# Patient Record
Sex: MALE | Race: WHITE | NOT HISPANIC OR LATINO | Employment: OTHER | URBAN - METROPOLITAN AREA
[De-identification: names, ages, dates, MRNs, and addresses within clinical notes are randomized per-mention and may not be internally consistent; named-entity substitution may affect disease eponyms.]

---

## 2018-11-19 ENCOUNTER — OFFICE VISIT CONVERTED (OUTPATIENT)
Dept: SURGERY | Facility: CLINIC | Age: 33
End: 2018-11-19
Attending: NURSE PRACTITIONER

## 2019-01-19 ENCOUNTER — HOSPITAL ENCOUNTER (OUTPATIENT)
Dept: URGENT CARE | Facility: CLINIC | Age: 34
Discharge: HOME | End: 2019-01-19
Attending: EMERGENCY MEDICINE

## 2019-01-21 LAB — BACTERIA SPEC AEROBE CULT: NORMAL

## 2019-09-11 ENCOUNTER — CONVERSION ENCOUNTER (OUTPATIENT)
Dept: UROLOGY | Facility: CLINIC | Age: 34
End: 2019-09-11

## 2019-09-11 ENCOUNTER — OFFICE VISIT CONVERTED (OUTPATIENT)
Dept: UROLOGY | Facility: CLINIC | Age: 34
End: 2019-09-11
Attending: UROLOGY

## 2019-09-11 ENCOUNTER — HOSPITAL ENCOUNTER (OUTPATIENT)
Dept: UROLOGY | Facility: CLINIC | Age: 34
Discharge: HOME | End: 2019-09-11
Attending: UROLOGY

## 2019-09-11 LAB
C TRACH RRNA CVX QL NAA+PROBE: NOT DETECTED
N GONORRHOEA DNA SPEC QL NAA+PROBE: NOT DETECTED

## 2019-09-19 ENCOUNTER — OFFICE VISIT CONVERTED (OUTPATIENT)
Dept: UROLOGY | Facility: CLINIC | Age: 34
End: 2019-09-19
Attending: UROLOGY

## 2019-09-19 ENCOUNTER — CONVERSION ENCOUNTER (OUTPATIENT)
Dept: UROLOGY | Facility: CLINIC | Age: 34
End: 2019-09-19

## 2019-10-21 ENCOUNTER — OFFICE VISIT CONVERTED (OUTPATIENT)
Dept: UROLOGY | Facility: CLINIC | Age: 34
End: 2019-10-21
Attending: UROLOGY

## 2019-10-21 ENCOUNTER — HOSPITAL ENCOUNTER (OUTPATIENT)
Dept: UROLOGY | Facility: CLINIC | Age: 34
Discharge: HOME | End: 2019-10-21
Attending: UROLOGY

## 2019-10-21 LAB — PSA SERPL-MCNC: 3.28 NG/ML (ref 0–4)

## 2020-03-17 ENCOUNTER — OFFICE VISIT CONVERTED (OUTPATIENT)
Dept: OTOLARYNGOLOGY | Facility: CLINIC | Age: 35
End: 2020-03-17
Attending: OTOLARYNGOLOGY

## 2020-05-12 ENCOUNTER — HOSPITAL ENCOUNTER (OUTPATIENT)
Dept: GENERAL RADIOLOGY | Facility: HOSPITAL | Age: 35
Discharge: HOME | End: 2020-05-12

## 2020-10-30 ENCOUNTER — OFFICE VISIT CONVERTED (OUTPATIENT)
Dept: UROLOGY | Facility: CLINIC | Age: 35
End: 2020-10-30
Attending: UROLOGY

## 2020-10-30 ENCOUNTER — HOSPITAL ENCOUNTER (OUTPATIENT)
Dept: UROLOGY | Facility: CLINIC | Age: 35
Discharge: HOME | End: 2020-10-30
Attending: UROLOGY

## 2020-10-31 LAB — PSA SERPL-MCNC: 2.95 NG/ML (ref 0–4)

## 2021-05-13 NOTE — PROGRESS NOTES
Progress Note      Patient Name: Alphonso Santiago   Patient ID: [id number]   Sex: Male   YOB: 1985    Primary Care Provider: Shahnaz ALBRIGHT   Referring Provider: RASHARD BOOGIE    Visit Date: October 30, 2020    Provider: Jaylin Cruz MD   Location: [facility]   Location Address: [address] [place] [address], [address] [place] [address]  [id number]   Location Phone: [phone]          Chief Complaint  · Follow up for elevated PSA      History Of Present Illness  The patient is a 35 year old /White male , who presents for a follow up for elevated PSA.      PSA 10/21/2019  3.28.  Patient had prostatitis with elevation of PSA about a year ago.  His father did not have a prostate cancer and there is no history of prostate cancer in his mom's family.  Is urinating fine and has no dysuria.  He does not drink alcohol.  No recent use of marijuana.  He has no dysuria and his stream is okay.  He has no nocturia.  No real frequency in the daytime.  Patient is sexually active.       Past Medical History  Benign hypertension; Bursitis; Depressive Disorder; Headache; Hyperlipemia; Insomnia; Mild Right Bursitis-scapular; Nosebleed; Prostatitis; Stroke; Thoracic back pain         Past Surgical History  Colonoscopy; EGD (Endoscopy)         Medication List  cyclobenzaprine 10 mg oral tablet; diclofenac sodium 75 mg oral tablet,delayed release (DR/EC); hydrocodone-acetaminophen 5-325 mg oral tablet; hydroxyzine HCl 25 mg oral tablet; lorazepam 1 mg oral tablet; losartan 50 mg oral tablet; Seroquel 50 mg oral tablet         Allergy List  NO KNOWN DRUG ALLERGIES         Family Medical History  Family history of breast cancer; Family history of stroke; Family history of heart disease; Family history of diabetes mellitus         Social History  Alcohol (Former); Caffeine (Current some day); Claustophobic (Unknown); lives with children; Recreational Drug Use (Never); Second hand smoke exposure  "(Never); Single.; Tobacco (Former)         Review of Systems  · Constitutional  o Denies  o : fever, headache, chills  · Eyes  o Denies  o : eye pain, double vision, blurred vision  · HENT  o Denies  o : sinus problems, sore throat, ear infection  · Cardiovascular  o Denies  o : chest pain, high blood pressure, varicosities  · Respiratory  o Denies  o : shortness of breath, wheezing, frequent cough  · Gastrointestinal  o Admits  o : constipation  o Denies  o : nausea, vomiting, heartburn, indigestion, abdominal pain  · Genitourinary  o Denies  o : urgency, frequency, urinary retention, painful urination  · Integument  o Denies  o : rash, itching, boils  · Neurologic  o Denies  o : tingling or numbness, tremors, dizzy spells  · Musculoskeletal  o Admits  o : joint pain, back pain  o Denies  o : neck pain  · Endocrine  o Denies  o : cold intolerance, heat intolerance, tired, excessive thirst, sluggish  · Psychiatric  o Admits  o : feels satisfied with life  o Denies  o : severe depression, concerns with hurting themselves  · Heme-Lymph  o Denies  o : swollen glands, blood clotting problems  · Allergic-Immunologic  o Denies  o : sinus allergy symptoms, hay fever      Vitals  Date Time BP Position Site L\R Cuff Size HR RR TEMP (F) WT  HT  BMI kg/m2 BSA m2 O2 Sat FR L/min FiO2 HC       10/30/2020 03:43 /90 Sitting    98 - R  98.9 255lbs 0oz 6'  1\" 33.64 2.44             Physical Examination  · Constitutional  o Appearance  o : 35-year-old white male who has evidence of previous stroke on the left side. Is walking with a cane and his left knee is buckling up on him  · Neck  o Thyroid  o : Normal size without tenderness, nodules or masses  · Respiratory  o Respiratory Effort  o : Breathing is unlabored without accessory muscle use  o Inspection of Chest  o : normal appearance, no retractions  o Auscultation of Lungs  o : Normal breath sounds  · Cardiovascular  o Heart  o :   § Auscultation of Heart  § : regular rate " and rhythm, no murmurs, gallops or rubs  o Peripheral Vascular System  o : No abnormalities  · Gastrointestinal  o Abdominal Examination  o : Scaphoid abdomen which is non-tender to palpation with normal tone and without rigidity or guarding. Normal bowel sounds. No masses present.  o Liver and spleen  o : No hepatomegaly present. Liver is non-tender to palpation and spleen is not palpable.  o Hernias  o : No abdominal wall hernias are present.  · Genitourinary  o Bladder  o : no abnormalities  o Penis  o : Normal appearance without lesion, discharge or masses.  o Urethral Meatus  o : no abnormalities  o Scrotum and Scrotal Contents  o :   § Scrotum  § : no abnormalities  § Epididymides  § : no abnormalities  § Testes  § : no abnormalities  o Digital Rectal Examination  o :   § Prostate  § : nontender to palpation, size normal, no nodules present, consistency normal  · Lymphatic  o Neck  o : No lymphadenopathy present  o Axilla  o : No lymphadenopathy present  · Skin and Subcutaneous Tissue  o General Inspection  o : No rashes, lesions or areas of discoloration present. Skin turgor is normal.  o General Palpation  o : No abnormalities, masses or tenderness on palpation.  · Neurologic  o Mental Status Examination  o : grossly oriented to person, place and time  o Gait and Station  o : Patient's left upper and lower extremities weak and he has difficulty with walking  · Psychiatric  o Mood and Affect  o : mood normal, affect appropriate      Figure 1.0: Pain Rating Scale-Dwight         Results  · In-Office Procedures  o Lab procedure  § Automated dipstick urinalysis with microscopy (88422)   § Color Ur: Yellow   § Clarity Ur: Clear   § Glucose Ur Ql Strip: Negative   § Bilirub Ur Ql Strip: Negative   § Ketones Ur Ql Strip: Negative   § Sp Gr Ur Qn: 1.025   § Hgb Ur Ql Strip: Negative   § pH Ur-LsCnc: 6.0   § Prot Ur Ql Strip: Negative   § Urobilinogen Ur Strip-mCnc: 0.2 E.U./dL   § Nitrite Ur Ql Strip: Negative   § WBC  Est Ur Ql Strip: Negative   § RBC UrnS Qn HPF: 0   § WBC UrnS Qn HPF: 0   § Bacteria UrnS Qn HPF: 0   § Crystals UrnS Qn HPF: 0   § Epithelial Cells (non renal): 0 /HPF  § Epithelial Cells (renal): 0       Assessment  · BPH without urinary obstruction     600.00/N40.0  · Elevated Prostate Specific Antigen (PSA)     790.93/R97.2  History of  · Prostate Cancer Screening     V76.44/Z12.5    Problems Reconciled  Plan  · Orders  o PSA Ultrasensitive, ANNUAL SCREENING [facility] ([id number], ) - V76.44/Z12.5 - 10/30/2020  · Medications  o Medications have been Reconciled  o Transition of Care or Provider Policy  · Instructions  o His PSA was elevated at 1 time but last 1 was normal. Patient claimed that he has episodes of inflammation of his joints. Is concerned that might be just inflammation have increased his PSA. It might be prudent to refer him to an immunologist to check his immune system and is going discussed that with his PCP. I will check him in 1 year's time            Electronically Signed by: Jaylin Cruz MD -Author on October 30, 2020 04:36:40 PM

## 2021-05-14 VITALS
SYSTOLIC BLOOD PRESSURE: 144 MMHG | TEMPERATURE: 98.9 F | WEIGHT: 255 LBS | DIASTOLIC BLOOD PRESSURE: 90 MMHG | BODY MASS INDEX: 33.8 KG/M2 | HEART RATE: 98 BPM | HEIGHT: 73 IN

## 2021-05-15 VITALS
WEIGHT: 239 LBS | TEMPERATURE: 98.1 F | SYSTOLIC BLOOD PRESSURE: 121 MMHG | BODY MASS INDEX: 31.68 KG/M2 | HEIGHT: 73 IN | DIASTOLIC BLOOD PRESSURE: 84 MMHG | HEART RATE: 99 BPM

## 2021-05-15 VITALS
HEIGHT: 73 IN | HEART RATE: 92 BPM | SYSTOLIC BLOOD PRESSURE: 130 MMHG | BODY MASS INDEX: 31.68 KG/M2 | TEMPERATURE: 98.5 F | WEIGHT: 239 LBS | DIASTOLIC BLOOD PRESSURE: 75 MMHG

## 2021-05-15 VITALS — BODY MASS INDEX: 33.59 KG/M2 | HEIGHT: 72 IN | WEIGHT: 248 LBS | RESPIRATION RATE: 16 BRPM | TEMPERATURE: 98.3 F

## 2021-05-15 VITALS
HEIGHT: 73 IN | DIASTOLIC BLOOD PRESSURE: 84 MMHG | HEART RATE: 89 BPM | TEMPERATURE: 98.9 F | BODY MASS INDEX: 31.68 KG/M2 | SYSTOLIC BLOOD PRESSURE: 131 MMHG | WEIGHT: 239 LBS

## 2021-05-16 VITALS — HEIGHT: 72 IN | WEIGHT: 249.37 LBS | RESPIRATION RATE: 16 BRPM | BODY MASS INDEX: 33.78 KG/M2

## 2021-08-02 ENCOUNTER — HOSPITAL ENCOUNTER (OUTPATIENT)
Dept: GENERAL RADIOLOGY | Facility: HOSPITAL | Age: 36
Discharge: HOME | End: 2021-08-02

## 2021-08-02 ENCOUNTER — TRANSCRIBE ORDERS (OUTPATIENT)
Dept: ADMINISTRATIVE | Facility: HOSPITAL | Age: 36
End: 2021-08-02

## 2021-08-02 DIAGNOSIS — G89.29 CHRONIC BILATERAL LOW BACK PAIN, UNSPECIFIED WHETHER SCIATICA PRESENT: ICD-10-CM

## 2021-08-02 DIAGNOSIS — M54.50 CHRONIC BILATERAL LOW BACK PAIN, UNSPECIFIED WHETHER SCIATICA PRESENT: ICD-10-CM

## 2021-08-02 DIAGNOSIS — M25.559 HIP PAIN: ICD-10-CM

## 2021-08-02 DIAGNOSIS — R52 PAIN: ICD-10-CM

## 2021-08-02 DIAGNOSIS — M79.672 PAIN IN BOTH FEET: ICD-10-CM

## 2021-08-02 DIAGNOSIS — M79.671 PAIN IN BOTH FEET: ICD-10-CM

## 2021-08-02 DIAGNOSIS — M79.671 PAIN IN BOTH FEET: Primary | ICD-10-CM

## 2021-08-02 DIAGNOSIS — M79.672 PAIN IN BOTH FEET: Primary | ICD-10-CM

## 2021-08-02 PROCEDURE — 73130 X-RAY EXAM OF HAND: CPT

## 2021-08-02 PROCEDURE — 72170 X-RAY EXAM OF PELVIS: CPT

## 2021-08-02 PROCEDURE — 73630 X-RAY EXAM OF FOOT: CPT

## 2021-08-02 PROCEDURE — 72100 X-RAY EXAM L-S SPINE 2/3 VWS: CPT

## 2021-09-03 PROCEDURE — U0005 INFEC AGEN DETEC AMPLI PROBE: HCPCS | Performed by: FAMILY MEDICINE

## 2021-09-03 PROCEDURE — U0003 INFECTIOUS AGENT DETECTION BY NUCLEIC ACID (DNA OR RNA); SEVERE ACUTE RESPIRATORY SYNDROME CORONAVIRUS 2 (SARS-COV-2) (CORONAVIRUS DISEASE [COVID-19]), AMPLIFIED PROBE TECHNIQUE, MAKING USE OF HIGH THROUGHPUT TECHNOLOGIES AS DESCRIBED BY CMS-2020-01-R: HCPCS | Performed by: FAMILY MEDICINE

## 2021-09-06 ENCOUNTER — TELEPHONE (OUTPATIENT)
Dept: URGENT CARE | Facility: CLINIC | Age: 36
End: 2021-09-06

## 2021-10-12 ENCOUNTER — OFFICE VISIT (OUTPATIENT)
Dept: OTOLARYNGOLOGY | Facility: CLINIC | Age: 36
End: 2021-10-12

## 2021-10-12 VITALS — BODY MASS INDEX: 36.05 KG/M2 | WEIGHT: 272 LBS | TEMPERATURE: 97.2 F | HEIGHT: 73 IN

## 2021-10-12 DIAGNOSIS — J31.0 RHINITIS MEDICAMENTOSA: Primary | ICD-10-CM

## 2021-10-12 DIAGNOSIS — J34.89 NASAL OBSTRUCTION: ICD-10-CM

## 2021-10-12 DIAGNOSIS — T48.5X5A RHINITIS MEDICAMENTOSA: Primary | ICD-10-CM

## 2021-10-12 PROCEDURE — 99214 OFFICE O/P EST MOD 30 MIN: CPT | Performed by: OTOLARYNGOLOGY

## 2021-10-12 RX ORDER — FLUTICASONE PROPIONATE 50 MCG
2 SPRAY, SUSPENSION (ML) NASAL DAILY
Qty: 16 G | Refills: 6 | Status: SHIPPED | OUTPATIENT
Start: 2021-10-12

## 2021-10-12 RX ORDER — DEXAMETHASONE 4 MG/1
TABLET ORAL
COMMUNITY
Start: 2021-09-04 | End: 2022-05-13

## 2021-10-12 RX ORDER — SYRINGE WITH NEEDLE, 1 ML 25GX5/8"
SYRINGE, EMPTY DISPOSABLE MISCELLANEOUS
COMMUNITY
Start: 2021-09-08

## 2021-10-27 ENCOUNTER — TELEPHONE (OUTPATIENT)
Dept: UROLOGY | Facility: CLINIC | Age: 36
End: 2021-10-27

## 2021-10-27 NOTE — TELEPHONE ENCOUNTER
Caller: AMADOU    Relationship: SELF    Best call back number: [phone]    What is the best time to reach you: ANY    Who are you requesting to speak with (clinical staff, provider,  specific staff member): ANY    What was the call regarding: PT RECVD CALL ASKING TO CONFIRM APPT ON 11/2/21. HE WAS UNAWARE HE NEEDED TO BE SEEN AGAIN.     PT STATES HE WAS TOLD NO REASON TO RETURN, BUT PROG NOTE STATES 1 YEAR FOLLOW UP. PT UNAWARE OF APPT BEING SCHEDULED. SAYS HE WILL COME IN IF NEEDED BUT IS NOT HAVING ANY ISSUES AT THE MOMENT.       Do you require a callback: YES

## 2022-01-04 ENCOUNTER — TRANSCRIBE ORDERS (OUTPATIENT)
Dept: LAB | Facility: HOSPITAL | Age: 37
End: 2022-01-04

## 2022-01-04 ENCOUNTER — LAB (OUTPATIENT)
Dept: LAB | Facility: HOSPITAL | Age: 37
End: 2022-01-04

## 2022-01-04 DIAGNOSIS — M51.36 OTHER INTERVERTEBRAL DISC DEGENERATION, LUMBAR REGION: Primary | ICD-10-CM

## 2022-01-04 DIAGNOSIS — M51.36 OTHER INTERVERTEBRAL DISC DEGENERATION, LUMBAR REGION: ICD-10-CM

## 2022-01-04 LAB — MRSA DNA SPEC QL NAA+PROBE: NORMAL

## 2022-01-04 PROCEDURE — 87641 MR-STAPH DNA AMP PROBE: CPT

## 2022-05-13 PROCEDURE — U0004 COV-19 TEST NON-CDC HGH THRU: HCPCS

## 2022-05-13 PROCEDURE — 87081 CULTURE SCREEN ONLY: CPT

## 2022-05-13 PROCEDURE — U0005 INFEC AGEN DETEC AMPLI PROBE: HCPCS

## 2022-08-26 ENCOUNTER — TRANSCRIBE ORDERS (OUTPATIENT)
Dept: ADMINISTRATIVE | Facility: HOSPITAL | Age: 37
End: 2022-08-26

## 2022-08-26 DIAGNOSIS — M54.12 BRACHIAL NEURITIS: Primary | ICD-10-CM

## 2023-04-13 ENCOUNTER — HOSPITAL ENCOUNTER (EMERGENCY)
Facility: HOSPITAL | Age: 38
Discharge: HOME | End: 2023-04-13
Attending: STUDENT IN AN ORGANIZED HEALTH CARE EDUCATION/TRAINING PROGRAM | Admitting: STUDENT IN AN ORGANIZED HEALTH CARE EDUCATION/TRAINING PROGRAM
Payer: MEDICARE

## 2023-04-13 ENCOUNTER — APPOINTMENT (OUTPATIENT)
Dept: GENERAL RADIOLOGY | Facility: HOSPITAL | Age: 38
End: 2023-04-13
Payer: MEDICARE

## 2023-04-13 VITALS
BODY MASS INDEX: 32.37 KG/M2 | DIASTOLIC BLOOD PRESSURE: 80 MMHG | RESPIRATION RATE: 22 BRPM | WEIGHT: 244.27 LBS | SYSTOLIC BLOOD PRESSURE: 114 MMHG | OXYGEN SATURATION: 99 % | HEART RATE: 66 BPM | HEIGHT: 73 IN

## 2023-04-13 DIAGNOSIS — E87.6 HYPOKALEMIA: Primary | ICD-10-CM

## 2023-04-13 LAB
ALBUMIN SERPL-MCNC: 4.5 G/DL (ref 3.5–5.2)
ALBUMIN/GLOB SERPL: 1.8 G/DL
ALP SERPL-CCNC: 80 U/L (ref 39–117)
ALT SERPL W P-5'-P-CCNC: 57 U/L (ref 1–41)
AMPHET+METHAMPHET UR QL: NEGATIVE
ANION GAP SERPL CALCULATED.3IONS-SCNC: 10.6 MMOL/L (ref 5–15)
ANION GAP SERPL CALCULATED.3IONS-SCNC: 15.7 MMOL/L (ref 5–15)
AST SERPL-CCNC: 30 U/L (ref 1–40)
BARBITURATES UR QL SCN: NEGATIVE
BASOPHILS # BLD AUTO: 0.02 10*3/MM3 (ref 0–0.2)
BASOPHILS NFR BLD AUTO: 0.6 % (ref 0–1.5)
BENZODIAZ UR QL SCN: NEGATIVE
BILIRUB SERPL-MCNC: 0.5 MG/DL (ref 0–1.2)
BUN SERPL-MCNC: 14 MG/DL (ref 6–20)
BUN SERPL-MCNC: 17 MG/DL (ref 6–20)
BUN/CREAT SERPL: 17.5 (ref 7–25)
BUN/CREAT SERPL: 19.8 (ref 7–25)
CALCIUM SPEC-SCNC: 8.3 MG/DL (ref 8.6–10.5)
CALCIUM SPEC-SCNC: 8.8 MG/DL (ref 8.6–10.5)
CANNABINOIDS SERPL QL: POSITIVE
CHLORIDE SERPL-SCNC: 103 MMOL/L (ref 98–107)
CHLORIDE SERPL-SCNC: 99 MMOL/L (ref 98–107)
CO2 SERPL-SCNC: 21.3 MMOL/L (ref 22–29)
CO2 SERPL-SCNC: 24.4 MMOL/L (ref 22–29)
COCAINE UR QL: NEGATIVE
CREAT SERPL-MCNC: 0.8 MG/DL (ref 0.76–1.27)
CREAT SERPL-MCNC: 0.86 MG/DL (ref 0.76–1.27)
DEPRECATED RDW RBC AUTO: 37.3 FL (ref 37–54)
EGFRCR SERPLBLD CKD-EPI 2021: 113 ML/MIN/1.73
EGFRCR SERPLBLD CKD-EPI 2021: 115.5 ML/MIN/1.73
EOSINOPHIL # BLD AUTO: 0.07 10*3/MM3 (ref 0–0.4)
EOSINOPHIL NFR BLD AUTO: 2.1 % (ref 0.3–6.2)
ERYTHROCYTE [DISTWIDTH] IN BLOOD BY AUTOMATED COUNT: 11.6 % (ref 12.3–15.4)
GLOBULIN UR ELPH-MCNC: 2.5 GM/DL
GLUCOSE SERPL-MCNC: 104 MG/DL (ref 65–99)
GLUCOSE SERPL-MCNC: 147 MG/DL (ref 65–99)
HCT VFR BLD AUTO: 41 % (ref 37.5–51)
HGB BLD-MCNC: 15.2 G/DL (ref 13–17.7)
HOLD SPECIMEN: NORMAL
HOLD SPECIMEN: NORMAL
IMM GRANULOCYTES # BLD AUTO: 0 10*3/MM3 (ref 0–0.05)
IMM GRANULOCYTES NFR BLD AUTO: 0 % (ref 0–0.5)
LYMPHOCYTES # BLD AUTO: 1.33 10*3/MM3 (ref 0.7–3.1)
LYMPHOCYTES NFR BLD AUTO: 39.9 % (ref 19.6–45.3)
MAGNESIUM SERPL-MCNC: 1.9 MG/DL (ref 1.6–2.6)
MCH RBC QN AUTO: 32.6 PG (ref 26.6–33)
MCHC RBC AUTO-ENTMCNC: 37.1 G/DL (ref 31.5–35.7)
MCV RBC AUTO: 88 FL (ref 79–97)
METHADONE UR QL SCN: NEGATIVE
MONOCYTES # BLD AUTO: 0.34 10*3/MM3 (ref 0.1–0.9)
MONOCYTES NFR BLD AUTO: 10.2 % (ref 5–12)
NEUTROPHILS NFR BLD AUTO: 1.57 10*3/MM3 (ref 1.7–7)
NEUTROPHILS NFR BLD AUTO: 47.2 % (ref 42.7–76)
NRBC BLD AUTO-RTO: 0 /100 WBC (ref 0–0.2)
OPIATES UR QL: NEGATIVE
OXYCODONE UR QL SCN: POSITIVE
PLATELET # BLD AUTO: 142 10*3/MM3 (ref 140–450)
PMV BLD AUTO: 11 FL (ref 6–12)
POTASSIUM SERPL-SCNC: 2.6 MMOL/L (ref 3.5–5.2)
POTASSIUM SERPL-SCNC: 3.9 MMOL/L (ref 3.5–5.2)
PROT SERPL-MCNC: 7 G/DL (ref 6–8.5)
RBC # BLD AUTO: 4.66 10*6/MM3 (ref 4.14–5.8)
SODIUM SERPL-SCNC: 136 MMOL/L (ref 136–145)
SODIUM SERPL-SCNC: 138 MMOL/L (ref 136–145)
TROPONIN T SERPL HS-MCNC: <6 NG/L
WBC NRBC COR # BLD: 3.33 10*3/MM3 (ref 3.4–10.8)
WHOLE BLOOD HOLD COAG: NORMAL
WHOLE BLOOD HOLD SPECIMEN: NORMAL

## 2023-04-13 PROCEDURE — 80053 COMPREHEN METABOLIC PANEL: CPT

## 2023-04-13 PROCEDURE — 80307 DRUG TEST PRSMV CHEM ANLYZR: CPT

## 2023-04-13 PROCEDURE — 93005 ELECTROCARDIOGRAM TRACING: CPT

## 2023-04-13 PROCEDURE — 84484 ASSAY OF TROPONIN QUANT: CPT

## 2023-04-13 PROCEDURE — 93005 ELECTROCARDIOGRAM TRACING: CPT | Performed by: INTERNAL MEDICINE

## 2023-04-13 PROCEDURE — 93005 ELECTROCARDIOGRAM TRACING: CPT | Performed by: STUDENT IN AN ORGANIZED HEALTH CARE EDUCATION/TRAINING PROGRAM

## 2023-04-13 PROCEDURE — 0 POTASSIUM CHLORIDE 10 MEQ/100ML SOLUTION: Performed by: STUDENT IN AN ORGANIZED HEALTH CARE EDUCATION/TRAINING PROGRAM

## 2023-04-13 PROCEDURE — 71045 X-RAY EXAM CHEST 1 VIEW: CPT

## 2023-04-13 PROCEDURE — 99284 EMERGENCY DEPT VISIT MOD MDM: CPT

## 2023-04-13 PROCEDURE — 85025 COMPLETE CBC W/AUTO DIFF WBC: CPT

## 2023-04-13 PROCEDURE — 96365 THER/PROPH/DIAG IV INF INIT: CPT

## 2023-04-13 PROCEDURE — 96366 THER/PROPH/DIAG IV INF ADDON: CPT

## 2023-04-13 PROCEDURE — 83735 ASSAY OF MAGNESIUM: CPT

## 2023-04-13 RX ORDER — SODIUM CHLORIDE 0.9 % (FLUSH) 0.9 %
10 SYRINGE (ML) INJECTION AS NEEDED
Status: DISCONTINUED | OUTPATIENT
Start: 2023-04-13 | End: 2023-04-13 | Stop reason: HOSPADM

## 2023-04-13 RX ORDER — POTASSIUM CHLORIDE 7.45 MG/ML
10 INJECTION INTRAVENOUS
Status: COMPLETED | OUTPATIENT
Start: 2023-04-13 | End: 2023-04-13

## 2023-04-13 RX ORDER — POTASSIUM CHLORIDE 750 MG/1
40 CAPSULE, EXTENDED RELEASE ORAL ONCE
Status: COMPLETED | OUTPATIENT
Start: 2023-04-13 | End: 2023-04-13

## 2023-04-13 RX ORDER — SODIUM CHLORIDE 9 MG/ML
INJECTION, SOLUTION INTRAVENOUS
Status: COMPLETED
Start: 2023-04-13 | End: 2023-04-13

## 2023-04-13 RX ADMIN — POTASSIUM CHLORIDE 40 MEQ: 10 CAPSULE, COATED, EXTENDED RELEASE ORAL at 05:47

## 2023-04-13 RX ADMIN — POTASSIUM CHLORIDE 10 MEQ: 10 INJECTION, SOLUTION INTRAVENOUS at 06:51

## 2023-04-13 RX ADMIN — POTASSIUM CHLORIDE 10 MEQ: 10 INJECTION, SOLUTION INTRAVENOUS at 07:57

## 2023-04-13 RX ADMIN — SODIUM CHLORIDE: 9 INJECTION, SOLUTION INTRAVENOUS at 07:39

## 2023-04-13 NOTE — ED PROVIDER NOTES
"Time: 6:35 AM EDT  Date of encounter:  4/13/2023  Independent Historian/Clinical History and Information was obtained by:   Patient and Family  Chief Complaint: palpitations    History is limited by: N/A    History of Present Illness:  Patient is a 38 y.o. year old male who presents to the emergency department for evaluation of palpitations and lightheadedness.  Patient states he woke up with the middle the night and his heart was beating extremely quickly.  Patient denies a history of hypokalemia.  Patient had a hemorrhagic stroke when he was 25.  Patient has a history of hypertension.  He denies any vomiting.  He denies any history of cardiac concerns.  He states he had chest pain and shortness of breath at the time.    HPI    Patient Care Team  Primary Care Provider: Marilia Corbin APRN    Past Medical History:     No Known Allergies  Past Medical History:   Diagnosis Date   • Anxiety    • Benign essential hypertension    • Bursitis 08/24/2017   • Bursitis 08/24/2017    MILD RIGHT SCAPULAR   • Depressive disorder    • Disease of thyroid gland    • GERD (gastroesophageal reflux disease)    • Headache    • Hyperlipemia    • Hyperlipidemia    • Hypertension    • Injury of back    • Insomnia    • Nasal congestion    • Nosebleed    • Prostatitis 09/19/2019   • Rheumatoid arthritis    • Stroke    • Thoracic back pain      Past Surgical History:   Procedure Laterality Date   • COLONOSCOPY  2018   • ENDOSCOPY  2018     Family History   Problem Relation Age of Onset   • Breast cancer Mother 50   • Diabetes Mother 50   • Stroke Father    • Heart disease Father    • Diabetes Father    • Diabetes Brother        Home Medications:  Prior to Admission medications    Medication Sig Start Date End Date Taking? Authorizing Provider   B-D 3CC LUER-KELLY SYR 22GX1\" 22G X 1\" 3 ML misc USE AS DIRECTED INTRAMUSCULARLY once a week for Testosterone 9/8/21   Provider, MD Payam   diclofenac (VOLTAREN) 75 MG EC tablet " diclofenac sodium 75 mg tablet,delayed release   TAKE 1 TABLET BY MOUTH TWICE DAILY WITH FOOD OR MILK    Emergency, Nurse Tamiko RN   fenofibrate (TRICOR) 145 MG tablet fenofibrate nanocrystallized 145 mg tablet   TAKE 1 TABLET BY MOUTH EVERY DAY    Emergency, Nurse Epic, RN   fluticasone (Flonase) 50 MCG/ACT nasal spray 2 sprays into the nostril(s) as directed by provider Daily. Administer 2 sprays in each nostril for each dose. 10/12/21   Maico Guy MD   hydrOXYzine (ATARAX) 50 MG tablet hydroxyzine HCl 50 mg tablet   TAKE 1 TABLET BY MOUTH TWICE DAILY AS NEEDED    Emergency, Nurse Tamiko RN   losartan (COZAAR) 100 MG tablet losartan 100 mg tablet    Emergency, Nurse Tamiko RN   methocarbamol (ROBAXIN) 750 MG tablet Take 750 mg by mouth 2 (Two) Times a Day. 7/6/21   Emergency, Nurse Epic, RN   oxyCODONE-acetaminophen (PERCOCET) 5-325 MG per tablet Every 6 (Six) Hours.    Emergency, Nurse Tamiko, RN   pregabalin (LYRICA) 150 MG capsule Every 8 (Eight) Hours.    Emergency, Nurse Epic, RN   QUEtiapine (SEROquel) 50 MG tablet quetiapine 50 mg tablet   TAKE 2 TABLETS BY MOUTH EVERY NIGHT AT BEDTIME    Emergency, Nurse Tamiko RN        Social History:   Social History     Tobacco Use   • Smoking status: Former     Packs/day: 1.00     Types: Cigarettes   • Smokeless tobacco: Never   • Tobacco comments:     STARTED AGE 20 QUIT AGE 28   Vaping Use   • Vaping Use: Never used   Substance Use Topics   • Alcohol use: Not Currently   • Drug use: Never         Review of Systems:  Review of Systems   Constitutional: Negative for chills and fever.   HENT: Negative for congestion, rhinorrhea and sore throat.    Eyes: Negative for pain and visual disturbance.   Respiratory: Positive for shortness of breath. Negative for apnea, cough and chest tightness.    Cardiovascular: Positive for chest pain and palpitations.   Gastrointestinal: Negative for abdominal pain, diarrhea, nausea and vomiting.   Genitourinary: Negative for  "difficulty urinating and dysuria.   Musculoskeletal: Negative for joint swelling and myalgias.   Skin: Negative for color change.   Neurological: Negative for seizures and headaches.   Psychiatric/Behavioral: Negative.    All other systems reviewed and are negative.       Physical Exam:  /80   Pulse 66   Resp 22   Ht 185.4 cm (73\")   Wt 111 kg (244 lb 4.3 oz)   SpO2 99%   BMI 32.23 kg/m²     Physical Exam  Vitals and nursing note reviewed.   Constitutional:       General: He is not in acute distress.     Appearance: Normal appearance. He is not toxic-appearing.   HENT:      Head: Normocephalic and atraumatic.      Jaw: There is normal jaw occlusion.   Eyes:      General: Lids are normal.      Extraocular Movements: Extraocular movements intact.      Conjunctiva/sclera: Conjunctivae normal.      Pupils: Pupils are equal, round, and reactive to light.   Cardiovascular:      Rate and Rhythm: Normal rate and regular rhythm.      Pulses: Normal pulses.      Heart sounds: Normal heart sounds.   Pulmonary:      Effort: Pulmonary effort is normal. No respiratory distress.      Breath sounds: Normal breath sounds. No wheezing or rhonchi.   Abdominal:      General: Abdomen is flat.      Palpations: Abdomen is soft.      Tenderness: There is no abdominal tenderness. There is no guarding or rebound.   Musculoskeletal:         General: Normal range of motion.      Cervical back: Normal range of motion and neck supple.      Right lower leg: No edema.      Left lower leg: No edema.   Skin:     General: Skin is warm and dry.   Neurological:      Mental Status: He is alert and oriented to person, place, and time. Mental status is at baseline.   Psychiatric:         Mood and Affect: Mood normal.                  Procedures:  Procedures      Medical Decision Making:      Comorbidities that affect care:    Hypertension    External Notes reviewed:    None      The following orders were placed and all results were " independently analyzed by me:  Orders Placed This Encounter   Procedures   • XR Chest 1 View   • [place] Draw   • Comprehensive Metabolic Panel   • Magnesium   • Single High Sensitivity Troponin T   • CBC Auto Differential   • Urine Drug Screen - Urine, Clean Catch   • Basic Metabolic Panel   • Undress & Gown   • Continuous Pulse Oximetry   • ECG 12 Lead ED Triage Standing Order; Dysrhythmia   • ECG 12 Lead QT Measurement   • CBC & Differential   • Green Top (Gel)   • Lavender Top   • Gold Top - SST   • Light Blue Top       Medications Given in the Emergency Department:  Medications   potassium chloride (MICRO-K) CR capsule 40 mEq (40 mEq Oral Given 4/13/23 0547)   potassium chloride 10 mEq in 100 mL IVPB (0 mEq Intravenous Stopped 4/13/23 0857)   sodium chloride 0.9 % infusion  - ADS Override Pull (0 mL/hr  Stopped 4/13/23 0910)        ED Course:    ED Course as of 04/13/23 1359   u Apr 13, 2023   1021 Potassium: 3.9 [LQ]      ED Course User Index  [LQ] Vidhi Trejo MD       Labs:    Lab Results (last 24 hours)     Procedure Component Value Units Date/Time    CBC & Differential [[id number]]  (Abnormal) Collected: 04/13/23 0448    Specimen: Blood Updated: 04/13/23 0530    Narrative:      The following orders were created for panel order CBC & Differential.  Procedure                               Abnormality         Status                     ---------                               -----------         ------                     CBC Auto Differential[[id number]]        Abnormal            Final result               Scan Slide[[id number]]                                                                    Please view results for these tests on the individual orders.    Comprehensive Metabolic Panel [[id number]]  (Abnormal) Collected: 04/13/23 0448    Specimen: Blood Updated: 04/13/23 0529     Glucose 147 mg/dL      BUN 17 mg/dL      Creatinine 0.86 mg/dL      Sodium 136 mmol/L      Potassium 2.6 mmol/L      Chloride 99  mmol/L      CO2 21.3 mmol/L      Calcium 8.8 mg/dL      Total Protein 7.0 g/dL      Albumin 4.5 g/dL      ALT (SGPT) 57 U/L      AST (SGOT) 30 U/L      Alkaline Phosphatase 80 U/L      Total Bilirubin 0.5 mg/dL      Globulin 2.5 gm/dL      A/G Ratio 1.8 g/dL      BUN/Creatinine Ratio 19.8     Anion Gap 15.7 mmol/L      eGFR 113.0 mL/min/1.73     Narrative:      GFR Normal >60  Chronic Kidney Disease <60  Kidney Failure <15      Magnesium [[id number]]  (Normal) Collected: 04/13/23 0448    Specimen: Blood Updated: 04/13/23 0527     Magnesium 1.9 mg/dL     Single High Sensitivity Troponin T [[id number]]  (Normal) Collected: 04/13/23 0448    Specimen: Blood Updated: 04/13/23 0526     HS Troponin T <6 ng/L     Narrative:      High Sensitive Troponin T Reference Range:  <10.0 ng/L- Negative Female for AMI  <15.0 ng/L- Negative Male for AMI  >=10 - Abnormal Female indicating possible myocardial injury.  >=15 - Abnormal Male indicating possible myocardial injury.   Clinicians would have to utilize clinical acumen, EKG, Troponin, and serial changes to determine if it is an Acute Myocardial Infarction or myocardial injury due to an underlying chronic condition.         CBC Auto Differential [[id number]]  (Abnormal) Collected: 04/13/23 0448    Specimen: Blood Updated: 04/13/23 0530     WBC 3.33 10*3/mm3      RBC 4.66 10*6/mm3      Hemoglobin 15.2 g/dL      Hematocrit 41.0 %      MCV 88.0 fL      MCH 32.6 pg      MCHC 37.1 g/dL      RDW 11.6 %      RDW-SD 37.3 fl      MPV 11.0 fL      Platelets 142 10*3/mm3      Neutrophil % 47.2 %      Lymphocyte % 39.9 %      Monocyte % 10.2 %      Eosinophil % 2.1 %      Basophil % 0.6 %      Immature Grans % 0.0 %      Neutrophils, Absolute 1.57 10*3/mm3      Lymphocytes, Absolute 1.33 10*3/mm3      Monocytes, Absolute 0.34 10*3/mm3      Eosinophils, Absolute 0.07 10*3/mm3      Basophils, Absolute 0.02 10*3/mm3      Immature Grans, Absolute 0.00 10*3/mm3      nRBC 0.0 /100 WBC     Urine Drug  Screen - Urine, Clean Catch [[id number]]  (Abnormal) Collected: 04/13/23 0516    Specimen: Urine, Clean Catch Updated: 04/13/23 0538     Amphet/Methamphet, Screen Negative     Barbiturates Screen, Urine Negative     Benzodiazepine Screen, Urine Negative     Cocaine Screen, Urine Negative     Opiate Screen Negative     THC, Screen, Urine Positive     Methadone Screen, Urine Negative     Oxycodone Screen, Urine Positive    Narrative:      Negative Thresholds Per Drugs Screened:    Amphetamines                 500 ng/ml  Barbiturates                 200 ng/ml  Benzodiazepines              100 ng/ml  Cocaine                      300 ng/ml  Methadone                    300 ng/ml  Opiates                      300 ng/ml  Oxycodone                    100 ng/ml  THC                           50 ng/ml    The Normal Value for all drugs tested is negative. This report includes final unconfirmed screening results to be used for medical treatment purposes only. Unconfirmed results must not be used for non-medical purposes such as employment or legal testing. Clinical consideration should be applied to any drug of abuse test, particularly when unconfirmed results are used.            Basic Metabolic Panel [[id number]]  (Abnormal) Collected: 04/13/23 0919    Specimen: Blood Updated: 04/13/23 1018     Glucose 104 mg/dL      BUN 14 mg/dL      Creatinine 0.80 mg/dL      Sodium 138 mmol/L      Potassium 3.9 mmol/L      Comment: Slight hemolysis detected by analyzer. Results may be affected.        Chloride 103 mmol/L      CO2 24.4 mmol/L      Calcium 8.3 mg/dL      BUN/Creatinine Ratio 17.5     Anion Gap 10.6 mmol/L      eGFR 115.5 mL/min/1.73     Narrative:      GFR Normal >60  Chronic Kidney Disease <60  Kidney Failure <15             Imaging:    XR Chest 1 View    Result Date: 4/13/2023  PROCEDURE: XR CHEST 1 VW  COMPARISON: None.  INDICATIONS: Dysrhythmia(s).  FINDINGS: A single frontal (AP or PA upright portable) chest radiograph  reveals no cardiac enlargement and no acute infiltrate. No pneumothorax is seen.  External artifacts obscure detail.  Chronic calcified granulomatous disease may involve the chest.        No acute cardiopulmonary disease is seen radiographically.    Please note that portions of this note were completed with a voice recognition program.  DIANDRA GENTILE JR, MD       Electronically Signed and Approved By: DIANDRA GENTILE JR, MD on 4/13/2023 at 4:48                  Differential Diagnosis and Discussion:    Ddx: Arrhythmia, ACS, electrolyte abnormality, dehydration    All labs were reviewed and interpreted by me.  EKG was interpreted by me.    MDM     Patient has a potassium of 2.6 and a magnesium of 1.9.  Patient also has a QTc of 580.  Troponin within normal limits glucose 147.  Chest x-ray unremarkable.  Repeat EKG had a QTc of 443.  Patient was adamant about not wanting to stay at the hospital as he had childcare needs.  I discussed staying with the hospitalist but as patient does not want to stay we discussed follow-up with his primary care doctor early next week after repletion here in the emergency department.    After oral and IV potassium patient had a potassium level of 3.9.      Consultants/Shared Management Plan:    I spoke with the hospitalist who evaluated the patient and felt comfortable with discharge and close follow-up.    Social Determinants of Health:    Patient is independent, reliable, and has access to care.       Disposition and Care Coordination:    Discharged: I considered escalation of care by admitting this patient for observation, however the patient has improved and is suitable and  stable for discharge.    I have explained discharge medications and the need for follow up with the patient/caretakers. This was also printed in the discharge instructions. Patient was discharged with the following medications and follow up:      Medication List      No changes were made to your prescriptions  during this visit.      Marilia Corbin APRN  [address] [facility] [address] [phone]             Final diagnoses:   Hypokalemia        ED Disposition     ED Disposition   Discharge    Condition   Stable    Comment   --             This medical record created using voice recognition software.           Vidhi Trejo MD  04/13/23 1769

## 2023-04-13 NOTE — H&P
[facility]  Date: 2023   Patient Name: Alphonso Santiago  : 1985  MRN: [id number]  Primary Care Physician:  Marilia Corbin APRN  Date of admission: 2023    Subjective   Subjective     Chief Complaint: Palpitations    HPI:    Alphonso Santiago is a 38 y.o. 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  He states he woke up and was feeling his heart beating fast, he felt short of breath during this episode.  He called 911, in the ambulance his heart rate was up into the 150s.  He was taken to the ED where potassium was found to be 2.6, QTc was prolonged at 579.  He denies any recent nausea, vomiting, diarrhea.  His Seroquel was recently increased by his primary care physician.  He does admit that he does not have a very diverse diet and eats a lot of fast food.  In discussing with the patient, he states he cannot stay in the hospital past 6 PM as he has to get home to care for his child.  He has no other option for childcare and states if he is admitted, he will leave regardless of physician recommendation.      Personal History     Past Medical History:  Past Medical History:   Diagnosis Date   • Anxiety    • Benign essential hypertension    • Bursitis 2017   • Bursitis 2017    MILD RIGHT SCAPULAR   • Depressive disorder    • Disease of thyroid gland    • GERD (gastroesophageal reflux disease)    • Headache    • Hyperlipemia    • Hyperlipidemia    • Hypertension    • Injury of back    • Insomnia    • Nasal congestion    • Nosebleed    • Prostatitis 2019   • Rheumatoid arthritis    • Stroke    • Thoracic back pain        Past Surgical History:  Past Surgical History:   Procedure Laterality Date   • COLONOSCOPY     • ENDOSCOPY  2018       Family History:   Family History   Problem Relation Age of  Onset   • Breast cancer Mother 50   • Diabetes Mother 50   • Stroke Father    • Heart disease Father    • Diabetes Father    • Diabetes Brother         Social History:   Social History     Socioeconomic History   • Marital status: Single   Tobacco Use   • Smoking status: Former     Packs/day: 1.00     Types: Cigarettes   • Smokeless tobacco: Never   • Tobacco comments:     STARTED AGE 20 QUIT AGE 28   Vaping Use   • Vaping Use: Never used   Substance and Sexual Activity   • Alcohol use: Not Currently   • Drug use: Never   • Sexual activity: Defer        Home Medications:  QUEtiapine, Syringe/Needle (Disp), diclofenac, fenofibrate, fluticasone, hydrOXYzine, losartan, methocarbamol, oxyCODONE-acetaminophen, and pregabalin    Allergies:  No Known Allergies    Review of Systems   A 14 point review of systems was obtained and otherwise negative unless stated in the HPI    Objective   Objective     Vitals:   Heart Rate:  [] 72  Resp:  [22] 22  BP: (120-125)/(80-85) 125/85    Physical Exam    Constitutional: Awake, alert, no acute distress   HENT: NCAT, mucous membranes moist   Respiratory: Clear to auscultation bilaterally, nonlabored respirations    Cardiovascular: RRR, no MRG   Gastrointestinal: Positive bowel sounds, soft, nontender, nondistended   Musculoskeletal: No bilateral ankle edema, no clubbing or cyanosis to extremities   Psychiatric: Appropriate affect, cooperative   Neurologic: Oriented x 3, strength symmetric in all extremities, Cranial Nerves grossly intact to confrontation, speech clear   Skin: No rashes     Result Review    Result Review:  I have personally reviewed the results from the time of this admission to 4/13/2023 08:02 EDT and agree with these findings:  []  Laboratory magnesium 1.9  CBC        4/13/2023    04:48   CBC   WBC 3.33     RBC 4.66     Hemoglobin 15.2     Hematocrit 41.0     MCV 88.0     MCH 32.6     MCHC 37.1     RDW 11.6     Platelets 142       CMP        4/13/2023    04:48    CMP   Glucose 147     BUN 17     Creatinine 0.86     EGFR 113.0     Sodium 136     Potassium 2.6     Chloride 99     Calcium 8.8     Total Protein 7.0     Albumin 4.5     Globulin 2.5     Total Bilirubin 0.5     Alkaline Phosphatase 80     AST (SGOT) 30     ALT (SGPT) 57     Albumin/Globulin Ratio 1.8     BUN/Creatinine Ratio 19.8     Anion Gap 15.7       []  Microbiology  []  Radiology  [x]  EKG/Telemetry Telemetry personally reviewed showing normal sinus rhythm.  EKG reviewed initially showing QTc prolongation.  Repeat EKG reviewed, QTc has improved to 443  []  Cardiology/Vascular   []  Pathology  []  Old records  []  Other:      Assessment & Plan   Assessment / Plan     Assessment/Plan:   Hypokalemia  QTc prolongation  History of CVA with residual left-sided weakness  Mild metabolic acidosis  History of chronic neck pain on chronic narcotics and Lyrica  Anxiety    Discussed with patient and ED physician, patient is refusing admission at this time  Potassium has been repleted in the ED, repeat EKG showed QTc has already improved to 443  At this point, would recheck BMP and based on potassium level, discharge from the ED on oral potassium replacement as he would not require admission at that point  Can start either 40 mEq daily or twice daily based on potassium levels  Recommend he either hold Seroquel x1 week, versus decrease to lower dose x1 week  Recommend follow-up with his primary care physician Dr. Patel within 1 week for potassium check and EKG for further QTc monitoring  If continued palpitations, would recommend Holter monitor  ED physician Dr. Trejo agrees with this plan, will recheck BMP and if stable likely DC home on oral potassium    Discussed case with: ED physician, bedside RN    DVT prophylaxis:  No DVT prophylaxis order currently exists.    Electronically signed by Benji Wheeler MD, 04/13/23, 8:02 AM EDT.

## 2023-04-13 NOTE — DISCHARGE INSTRUCTIONS
Follow-up with your doctor early next week and have your potassium level checked.  Return to the emergency department if you have palpitations, chest pain, or passout.

## 2023-04-14 LAB
QT INTERVAL: 388 MS
QT INTERVAL: 406 MS

## 2023-04-20 ENCOUNTER — HOSPITAL ENCOUNTER (EMERGENCY)
Facility: HOSPITAL | Age: 38
Discharge: HOME | End: 2023-04-20
Attending: EMERGENCY MEDICINE
Payer: MEDICARE

## 2023-04-20 ENCOUNTER — APPOINTMENT (OUTPATIENT)
Dept: GENERAL RADIOLOGY | Facility: HOSPITAL | Age: 38
End: 2023-04-20
Payer: MEDICARE

## 2023-04-20 VITALS
HEIGHT: 73 IN | DIASTOLIC BLOOD PRESSURE: 74 MMHG | HEART RATE: 87 BPM | OXYGEN SATURATION: 97 % | WEIGHT: 244.93 LBS | TEMPERATURE: 98 F | SYSTOLIC BLOOD PRESSURE: 111 MMHG | RESPIRATION RATE: 18 BRPM | BODY MASS INDEX: 32.46 KG/M2

## 2023-04-20 DIAGNOSIS — R00.2 PALPITATIONS: Primary | ICD-10-CM

## 2023-04-20 DIAGNOSIS — E87.6 HYPOKALEMIA: ICD-10-CM

## 2023-04-20 LAB
ALBUMIN SERPL-MCNC: 4.8 G/DL (ref 3.5–5.2)
ALBUMIN/GLOB SERPL: 1.7 G/DL
ALP SERPL-CCNC: 74 U/L (ref 39–117)
ALT SERPL W P-5'-P-CCNC: 70 U/L (ref 1–41)
ANION GAP SERPL CALCULATED.3IONS-SCNC: 13.7 MMOL/L (ref 5–15)
AST SERPL-CCNC: 38 U/L (ref 1–40)
BASOPHILS # BLD AUTO: 0.05 10*3/MM3 (ref 0–0.2)
BASOPHILS NFR BLD AUTO: 0.9 % (ref 0–1.5)
BILIRUB SERPL-MCNC: 0.5 MG/DL (ref 0–1.2)
BUN SERPL-MCNC: 14 MG/DL (ref 6–20)
BUN/CREAT SERPL: 16.7 (ref 7–25)
CALCIUM SPEC-SCNC: 9.4 MG/DL (ref 8.6–10.5)
CHLORIDE SERPL-SCNC: 101 MMOL/L (ref 98–107)
CO2 SERPL-SCNC: 22.3 MMOL/L (ref 22–29)
CREAT SERPL-MCNC: 0.84 MG/DL (ref 0.76–1.27)
DEPRECATED RDW RBC AUTO: 38.3 FL (ref 37–54)
EGFRCR SERPLBLD CKD-EPI 2021: 113.8 ML/MIN/1.73
EOSINOPHIL # BLD AUTO: 0.08 10*3/MM3 (ref 0–0.4)
EOSINOPHIL NFR BLD AUTO: 1.4 % (ref 0.3–6.2)
ERYTHROCYTE [DISTWIDTH] IN BLOOD BY AUTOMATED COUNT: 12 % (ref 12.3–15.4)
GEN 5 2HR TROPONIN T REFLEX: <6 NG/L
GLOBULIN UR ELPH-MCNC: 2.9 GM/DL
GLUCOSE SERPL-MCNC: 112 MG/DL (ref 65–99)
HCT VFR BLD AUTO: 42.7 % (ref 37.5–51)
HGB BLD-MCNC: 15.6 G/DL (ref 13–17.7)
HOLD SPECIMEN: NORMAL
HOLD SPECIMEN: NORMAL
IMM GRANULOCYTES # BLD AUTO: 0.01 10*3/MM3 (ref 0–0.05)
IMM GRANULOCYTES NFR BLD AUTO: 0.2 % (ref 0–0.5)
LIPASE SERPL-CCNC: 24 U/L (ref 13–60)
LYMPHOCYTES # BLD AUTO: 2.28 10*3/MM3 (ref 0.7–3.1)
LYMPHOCYTES NFR BLD AUTO: 39 % (ref 19.6–45.3)
MAGNESIUM SERPL-MCNC: 2.2 MG/DL (ref 1.6–2.6)
MCH RBC QN AUTO: 32.2 PG (ref 26.6–33)
MCHC RBC AUTO-ENTMCNC: 36.5 G/DL (ref 31.5–35.7)
MCV RBC AUTO: 88.2 FL (ref 79–97)
MONOCYTES # BLD AUTO: 0.65 10*3/MM3 (ref 0.1–0.9)
MONOCYTES NFR BLD AUTO: 11.1 % (ref 5–12)
NEUTROPHILS NFR BLD AUTO: 2.78 10*3/MM3 (ref 1.7–7)
NEUTROPHILS NFR BLD AUTO: 47.4 % (ref 42.7–76)
NRBC BLD AUTO-RTO: 0 /100 WBC (ref 0–0.2)
NT-PROBNP SERPL-MCNC: <36 PG/ML (ref 0–450)
PLATELET # BLD AUTO: 221 10*3/MM3 (ref 140–450)
PMV BLD AUTO: 10.3 FL (ref 6–12)
POTASSIUM SERPL-SCNC: 3.1 MMOL/L (ref 3.5–5.2)
PROT SERPL-MCNC: 7.7 G/DL (ref 6–8.5)
QT INTERVAL: 377 MS
QT INTERVAL: 394 MS
RBC # BLD AUTO: 4.84 10*6/MM3 (ref 4.14–5.8)
SODIUM SERPL-SCNC: 137 MMOL/L (ref 136–145)
TROPONIN T DELTA: NORMAL
TROPONIN T SERPL HS-MCNC: <6 NG/L
WBC NRBC COR # BLD: 5.85 10*3/MM3 (ref 3.4–10.8)
WHOLE BLOOD HOLD COAG: NORMAL
WHOLE BLOOD HOLD SPECIMEN: NORMAL

## 2023-04-20 PROCEDURE — 84484 ASSAY OF TROPONIN QUANT: CPT | Performed by: EMERGENCY MEDICINE

## 2023-04-20 PROCEDURE — 83880 ASSAY OF NATRIURETIC PEPTIDE: CPT | Performed by: EMERGENCY MEDICINE

## 2023-04-20 PROCEDURE — 93005 ELECTROCARDIOGRAM TRACING: CPT | Performed by: EMERGENCY MEDICINE

## 2023-04-20 PROCEDURE — 85025 COMPLETE CBC W/AUTO DIFF WBC: CPT | Performed by: EMERGENCY MEDICINE

## 2023-04-20 PROCEDURE — 93005 ELECTROCARDIOGRAM TRACING: CPT

## 2023-04-20 PROCEDURE — 71045 X-RAY EXAM CHEST 1 VIEW: CPT

## 2023-04-20 PROCEDURE — 36415 COLL VENOUS BLD VENIPUNCTURE: CPT

## 2023-04-20 PROCEDURE — 83690 ASSAY OF LIPASE: CPT | Performed by: EMERGENCY MEDICINE

## 2023-04-20 PROCEDURE — 99284 EMERGENCY DEPT VISIT MOD MDM: CPT

## 2023-04-20 PROCEDURE — 83735 ASSAY OF MAGNESIUM: CPT | Performed by: EMERGENCY MEDICINE

## 2023-04-20 PROCEDURE — 80053 COMPREHEN METABOLIC PANEL: CPT | Performed by: EMERGENCY MEDICINE

## 2023-04-20 RX ORDER — SODIUM CHLORIDE 0.9 % (FLUSH) 0.9 %
10 SYRINGE (ML) INJECTION AS NEEDED
Status: DISCONTINUED | OUTPATIENT
Start: 2023-04-20 | End: 2023-04-20 | Stop reason: HOSPADM

## 2023-04-20 RX ORDER — POTASSIUM CHLORIDE 750 MG/1
10 TABLET, FILM COATED, EXTENDED RELEASE ORAL 2 TIMES DAILY
Qty: 20 TABLET | Refills: 0 | Status: SHIPPED | OUTPATIENT
Start: 2023-04-20

## 2023-04-20 RX ORDER — ASPIRIN 81 MG/1
324 TABLET, CHEWABLE ORAL ONCE
Status: COMPLETED | OUTPATIENT
Start: 2023-04-20 | End: 2023-04-20

## 2023-04-20 RX ADMIN — ASPIRIN 81 MG 324 MG: 81 TABLET ORAL at 08:44

## 2023-04-20 NOTE — ED TRIAGE NOTES
"Patient complains of chest pain that started around 0400 today.  States it is generalized over entire chest, radiating to both arms.  Denies nausea or vomiting.  Admits sweating with onset.      Admits anxiety.      States chest \"heaviness\" worsens with deep breath.  Denies worsening with palpation.     States he was here last week for low potassium.   "

## 2023-04-20 NOTE — ED PROVIDER NOTES
Time: 8:20 AM EDT  Date of encounter:  4/20/2023  Independent Historian/Clinical History and Information was obtained by:   Patient  Chief Complaint: chest pain    History is limited by: N/A    History of Present Illness:  Patient is a 38 y.o. year old male who presents to the emergency department for evaluation of generlized chest pain/heaviness over entire chest that is worse with deep breathing and radiates to both arms, diaphoresis onset 4 am this morning. Patient denies nausea, vomiting. Patient c/o anxiety. Patient states she was in ED last week for low potassium. Patient has hx of HTN, hyperlipidemia, hypokalemia. Patient states he had stroke when he was 25 that caused recurrent anxiety. Patient states anxiety is worse when he is alone and today had anxiety with palpitations. Patient has appointment with Dr. Patel today. Patient states symptoms have now resolved.      History provided by:  Patient and relative   used: No        Patient Care Team  Primary Care Provider: Tye Patel MD    Past Medical History:     No Known Allergies  Past Medical History:   Diagnosis Date   • Anxiety    • Benign essential hypertension    • Bursitis 08/24/2017   • Bursitis 08/24/2017    MILD RIGHT SCAPULAR   • Depressive disorder    • Disease of thyroid gland    • GERD (gastroesophageal reflux disease)    • Headache    • Hyperlipemia    • Hyperlipidemia    • Hypertension    • Injury of back    • Insomnia    • Nasal congestion    • Nosebleed    • Prostatitis 09/19/2019   • Rheumatoid arthritis    • Stroke    • Thoracic back pain      Past Surgical History:   Procedure Laterality Date   • COLONOSCOPY  2018   • ENDOSCOPY  2018     Family History   Problem Relation Age of Onset   • Breast cancer Mother 50   • Diabetes Mother 50   • Stroke Father    • Heart disease Father    • Diabetes Father    • Diabetes Brother        Home Medications:  Prior to Admission medications    Medication Sig Start Date End  "Date Taking? Authorizing Provider   B-D 3CC LUER-KELLY SYR 22GX1\" 22G X 1\" 3 ML misc USE AS DIRECTED INTRAMUSCULARLY once a week for Testosterone 9/8/21   Provider, MD Payam   diclofenac (VOLTAREN) 75 MG EC tablet diclofenac sodium 75 mg tablet,delayed release   TAKE 1 TABLET BY MOUTH TWICE DAILY WITH FOOD OR MILK    Emergency, Nurse Tamiko RN   fenofibrate (TRICOR) 145 MG tablet fenofibrate nanocrystallized 145 mg tablet   TAKE 1 TABLET BY MOUTH EVERY DAY    Emergency, Nurse Epic, RN   fluticasone (Flonase) 50 MCG/ACT nasal spray 2 sprays into the nostril(s) as directed by provider Daily. Administer 2 sprays in each nostril for each dose. 10/12/21   Maico Guy MD   hydrOXYzine (ATARAX) 50 MG tablet hydroxyzine HCl 50 mg tablet   TAKE 1 TABLET BY MOUTH TWICE DAILY AS NEEDED    Emergency, Nurse Morrison RN   losartan (COZAAR) 100 MG tablet losartan 100 mg tablet    Emergency, Nurse Tamiko RN   methocarbamol (ROBAXIN) 750 MG tablet Take 750 mg by mouth 2 (Two) Times a Day. 7/6/21   Emergency, Nurse Morrison RN   oxyCODONE-acetaminophen (PERCOCET) 5-325 MG per tablet Every 6 (Six) Hours.    Emergency, Nurse Tamiko RN   pregabalin (LYRICA) 150 MG capsule Every 8 (Eight) Hours.    Emergency, Nurse Epic, RN   QUEtiapine (SEROquel) 50 MG tablet quetiapine 50 mg tablet   TAKE 2 TABLETS BY MOUTH EVERY NIGHT AT BEDTIME    Emergency, Nurse Tamiko RN        Social History:   Social History     Tobacco Use   • Smoking status: Former     Packs/day: 1.00     Types: Cigarettes   • Smokeless tobacco: Never   • Tobacco comments:     STARTED AGE 20 QUIT AGE 28   Vaping Use   • Vaping Use: Never used   Substance Use Topics   • Alcohol use: Not Currently   • Drug use: Never         Review of Systems:  Review of Systems   Constitutional: Positive for diaphoresis. Negative for activity change, chills, fatigue and unexpected weight change.   HENT: Negative for congestion, sinus pressure, sore throat and trouble swallowing.    Eyes: " "Negative for pain, discharge, redness and visual disturbance.   Respiratory: Negative for cough, chest tightness, shortness of breath and wheezing.    Cardiovascular: Positive for chest pain and palpitations.   Gastrointestinal: Negative for abdominal pain, diarrhea, nausea and vomiting.   Endocrine: Negative for cold intolerance and polydipsia.   Genitourinary: Negative for dysuria, frequency, hematuria and urgency.   Musculoskeletal: Positive for arthralgias (bilateral arm pain radiating from chest pain). Negative for joint swelling, neck pain and neck stiffness.   Skin: Negative for color change and rash.   Allergic/Immunologic: Negative for environmental allergies and immunocompromised state.   Neurological: Negative for dizziness, weakness and light-headedness.   Hematological: Does not bruise/bleed easily.   Psychiatric/Behavioral: Negative for agitation, confusion, dysphoric mood and suicidal ideas.        Physical Exam:  /74   Pulse 87   Temp 98 °F (36.7 °C) (Axillary)   Resp 18   Ht 185.4 cm (73\")   Wt 111 kg (244 lb 14.9 oz)   SpO2 97%   BMI 32.31 kg/m²     Physical Exam  Vitals and nursing note reviewed.   Constitutional:       General: He is not in acute distress.  HENT:      Head: Normocephalic and atraumatic.      Right Ear: External ear normal.      Left Ear: External ear normal.      Nose: Nose normal.      Mouth/Throat:      Mouth: Mucous membranes are moist.   Eyes:      Extraocular Movements: Extraocular movements intact.      Conjunctiva/sclera: Conjunctivae normal.      Pupils: Pupils are equal, round, and reactive to light.   Cardiovascular:      Rate and Rhythm: Normal rate and regular rhythm.      Pulses: Normal pulses.      Heart sounds: Normal heart sounds.   Pulmonary:      Effort: Pulmonary effort is normal.      Breath sounds: Normal breath sounds.   Abdominal:      General: Bowel sounds are normal. There is no distension.      Palpations: Abdomen is soft.   Musculoskeletal: "         General: Normal range of motion.      Cervical back: Neck supple.   Skin:     General: Skin is warm.      Capillary Refill: Capillary refill takes less than 2 seconds.   Neurological:      Mental Status: He is alert and oriented to person, place, and time.   Psychiatric:         Mood and Affect: Mood normal.         Behavior: Behavior normal.                  Procedures:  Procedures    EKG fingdings: sinus rhythm 79 BPM, normal P-waves, normal IN-interval, normal QRS with normal axis, T-Waves in Leads V1-V4, normal QT, QTC  Comparison: 04/13/2023 T-Wave inversion Hypokalemia.    I interpreted the findings of this study.      Medical Decision Making:      Comorbidities that affect care:    Hyperlipidemia, Hypokalemia, Hypertension    External Notes reviewed:    Previous Clinic Note: Previous lab and radiological studies      The following orders were placed and all results were independently analyzed by me:  Orders Placed This Encounter   Procedures   • XR Chest 1 View   • Nova Draw   • High Sensitivity Troponin T   • Comprehensive Metabolic Panel   • Lipase   • BNP   • Magnesium   • CBC Auto Differential   • High Sensitivity Troponin T 2Hr   • Undress & Gown   • Continuous Pulse Oximetry   • ECG 12 Lead ED Triage Standing Order; Chest Pain   • CBC & Differential   • Green Top (Gel)   • Lavender Top   • Gold Top - SST   • Light Blue Top       Medications Given in the Emergency Department:  Medications   aspirin chewable tablet 324 mg (324 mg Oral Given 4/20/23 0844)        ED Course:         Labs:     Results for orders placed or performed during the hospital encounter of 04/20/23   High Sensitivity Troponin T    Specimen: Blood   Result Value Ref Range    HS Troponin T <6 <15 ng/L   Comprehensive Metabolic Panel    Specimen: Blood   Result Value Ref Range    Glucose 112 (H) 65 - 99 mg/dL    BUN 14 6 - 20 mg/dL    Creatinine 0.84 0.76 - 1.27 mg/dL    Sodium 137 136 - 145 mmol/L    Potassium 3.1 (L) 3.5 -  5.2 mmol/L    Chloride 101 98 - 107 mmol/L    CO2 22.3 22.0 - 29.0 mmol/L    Calcium 9.4 8.6 - 10.5 mg/dL    Total Protein 7.7 6.0 - 8.5 g/dL    Albumin 4.8 3.5 - 5.2 g/dL    ALT (SGPT) 70 (H) 1 - 41 U/L    AST (SGOT) 38 1 - 40 U/L    Alkaline Phosphatase 74 39 - 117 U/L    Total Bilirubin 0.5 0.0 - 1.2 mg/dL    Globulin 2.9 gm/dL    A/G Ratio 1.7 g/dL    BUN/Creatinine Ratio 16.7 7.0 - 25.0    Anion Gap 13.7 5.0 - 15.0 mmol/L    eGFR 113.8 >60.0 mL/min/1.73   Lipase    Specimen: Blood   Result Value Ref Range    Lipase 24 13 - 60 U/L   BNP    Specimen: Blood   Result Value Ref Range    proBNP <36.0 0.0 - 450.0 pg/mL   Magnesium    Specimen: Blood   Result Value Ref Range    Magnesium 2.2 1.6 - 2.6 mg/dL   CBC Auto Differential    Specimen: Blood   Result Value Ref Range    WBC 5.85 3.40 - 10.80 10*3/mm3    RBC 4.84 4.14 - 5.80 10*6/mm3    Hemoglobin 15.6 13.0 - 17.7 g/dL    Hematocrit 42.7 37.5 - 51.0 %    MCV 88.2 79.0 - 97.0 fL    MCH 32.2 26.6 - 33.0 pg    MCHC 36.5 (H) 31.5 - 35.7 g/dL    RDW 12.0 (L) 12.3 - 15.4 %    RDW-SD 38.3 37.0 - 54.0 fl    MPV 10.3 6.0 - 12.0 fL    Platelets 221 140 - 450 10*3/mm3    Neutrophil % 47.4 42.7 - 76.0 %    Lymphocyte % 39.0 19.6 - 45.3 %    Monocyte % 11.1 5.0 - 12.0 %    Eosinophil % 1.4 0.3 - 6.2 %    Basophil % 0.9 0.0 - 1.5 %    Immature Grans % 0.2 0.0 - 0.5 %    Neutrophils, Absolute 2.78 1.70 - 7.00 10*3/mm3    Lymphocytes, Absolute 2.28 0.70 - 3.10 10*3/mm3    Monocytes, Absolute 0.65 0.10 - 0.90 10*3/mm3    Eosinophils, Absolute 0.08 0.00 - 0.40 10*3/mm3    Basophils, Absolute 0.05 0.00 - 0.20 10*3/mm3    Immature Grans, Absolute 0.01 0.00 - 0.05 10*3/mm3    nRBC 0.0 0.0 - 0.2 /100 WBC   High Sensitivity Troponin T 2Hr    Specimen: Blood   Result Value Ref Range    HS Troponin T <6 <15 ng/L    Troponin T Delta     ECG 12 Lead ED Triage Standing Order; Chest Pain   Result Value Ref Range    QT Interval 377 ms   ECG 12 Lead ED Triage Standing Order; Chest Pain    Result Value Ref Range    QT Interval 394 ms   Green Top (Gel)   Result Value Ref Range    Extra Tube Hold for add-ons.    Lavender Top   Result Value Ref Range    Extra Tube hold for add-on    Gold Top - SST   Result Value Ref Range    Extra Tube Hold for add-ons.    Light Blue Top   Result Value Ref Range    Extra Tube Hold for add-ons.          Lab Results (last 24 hours)     ** No results found for the last 24 hours. **           Imaging:    XR Chest 1 View    Result Date: 4/20/2023  Narrative: PROCEDURE: XR CHEST 1 VW  COMPARISON: [facility], , XR CHEST 1 VW, 4/13/2023, 4:45.  INDICATIONS: Chest Pains in middle of chest, onset today wrosening  FINDINGS:  No consolidations or pleural effusions are observed. The cardiac silhouette is within normal limits. The mediastinum is unremarkable. No definitive acute osseous abnormalities are seen on this single view.      Impression:   1. No evidence for acute cardiopulmonary process.       MECHE BROWN MD       Electronically Signed and Approved By: MECHE BROWN MD on 4/20/2023 at 6:57             XR Chest 1 View    Result Date: 4/13/2023  Narrative: PROCEDURE: XR CHEST 1 VW  COMPARISON: None.  INDICATIONS: Dysrhythmia(s).  FINDINGS: A single frontal (AP or PA upright portable) chest radiograph reveals no cardiac enlargement and no acute infiltrate. No pneumothorax is seen.  External artifacts obscure detail.  Chronic calcified granulomatous disease may involve the chest.       Impression:  No acute cardiopulmonary disease is seen radiographically.    Please note that portions of this note were completed with a voice recognition program.  DIANDRA GENTILE JR, MD       Electronically Signed and Approved By: DIANDRA GENTILE JR, MD on 4/13/2023 at 4:48                  No Radiology Exams Resulted Within Past 24 Hours      Differential Diagnosis and Discussion:    Chest Pain:  Based on the patient's signs and symptoms, I considered aortic dissection,  myocardial infaction, pulmonary embolism, cardiac tamponade, pericarditis, pneumothorax, musculoskeletal chest pain and other differential diagnosis as an etiology of the patient's chest pain.     All labs were reviewed and interpreted by me.  All X-rays impressions were independently interpreted by me.  EKG was interpreted by me.    MDM         Patient Care Considerations:    CT CHEST: I considered ordering a CT scan of the chest, however Patient has no signs of pulmonary embolism      Consultants/Shared Management Plan:    None    Social Determinants of Health:    Patient is independent, reliable, and has access to care.       Disposition and Care Coordination:    Discharged: The patient is suitable and stable for discharge with no need for consideration of observation or admission.    I have explained discharge medications and the need for follow up with the patient/caretakers. This was also printed in the discharge instructions. Patient was discharged with the following medications and follow up:      Medication List      New Prescriptions    potassium chloride 10 MEQ CR tablet  Take 1 tablet by mouth 2 (Two) Times a Day.           Where to Get Your Medications      [place] [facility] [address] - [phone] PH - [phone] FX  [address], [place] [address]    Phone: [phone]   · potassium chloride 10 MEQ CR tablet      Tye Patel MD  [address] [place] [id number] [phone]    Today  As scheduled       Final diagnoses:   Palpitations   Hypokalemia        ED Disposition     ED Disposition   Discharge    Condition   Stable    Comment   --             This medical record created using voice recognition software.        Documentation assistance provided by Nancy Stokes acting as scribe for No att. providers found. Information recorded by the scribe was done at my direction and has been  verified and validated by me.          Nancy Stokes  04/20/23 0908       Nancy Stokes  04/20/23 0930       Gareth Correia,   04/21/23 1529

## 2023-04-20 NOTE — DISCHARGE INSTRUCTIONS
Drink plenty of fluids.  Take medications as directed.  Return for worsening symptoms.  Follow-up with Dr. Patel today.

## 2023-04-27 LAB
QT INTERVAL: 377 MS
QT INTERVAL: 394 MS